# Patient Record
Sex: MALE | Race: BLACK OR AFRICAN AMERICAN | NOT HISPANIC OR LATINO | Employment: FULL TIME | URBAN - METROPOLITAN AREA
[De-identification: names, ages, dates, MRNs, and addresses within clinical notes are randomized per-mention and may not be internally consistent; named-entity substitution may affect disease eponyms.]

---

## 2020-07-24 ENCOUNTER — LAB VISIT (OUTPATIENT)
Dept: PRIMARY CARE CLINIC | Facility: OTHER | Age: 55
End: 2020-07-24
Attending: INTERNAL MEDICINE
Payer: COMMERCIAL

## 2020-07-24 DIAGNOSIS — Z03.818 ENCOUNTER FOR OBSERVATION FOR SUSPECTED EXPOSURE TO OTHER BIOLOGICAL AGENTS RULED OUT: ICD-10-CM

## 2020-07-24 PROCEDURE — U0003 INFECTIOUS AGENT DETECTION BY NUCLEIC ACID (DNA OR RNA); SEVERE ACUTE RESPIRATORY SYNDROME CORONAVIRUS 2 (SARS-COV-2) (CORONAVIRUS DISEASE [COVID-19]), AMPLIFIED PROBE TECHNIQUE, MAKING USE OF HIGH THROUGHPUT TECHNOLOGIES AS DESCRIBED BY CMS-2020-01-R: HCPCS

## 2020-08-03 LAB — SARS-COV-2 RNA RESP QL NAA+PROBE: NEGATIVE

## 2021-02-08 ENCOUNTER — OFFICE VISIT (OUTPATIENT)
Dept: URGENT CARE | Facility: CLINIC | Age: 56
End: 2021-02-08
Payer: COMMERCIAL

## 2021-02-08 VITALS
RESPIRATION RATE: 18 BRPM | TEMPERATURE: 98 F | HEART RATE: 69 BPM | OXYGEN SATURATION: 97 % | SYSTOLIC BLOOD PRESSURE: 116 MMHG | DIASTOLIC BLOOD PRESSURE: 77 MMHG

## 2021-02-08 DIAGNOSIS — Z20.822 COVID-19 VIRUS NOT DETECTED: ICD-10-CM

## 2021-02-08 DIAGNOSIS — Z20.822 ENCOUNTER FOR LABORATORY TESTING FOR COVID-19 VIRUS: Primary | ICD-10-CM

## 2021-02-08 LAB
CTP QC/QA: YES
SARS-COV-2 RDRP RESP QL NAA+PROBE: NEGATIVE

## 2021-02-08 PROCEDURE — U0002: ICD-10-PCS | Mod: QW,S$GLB,, | Performed by: NURSE PRACTITIONER

## 2021-02-08 PROCEDURE — 99203 OFFICE O/P NEW LOW 30 MIN: CPT | Mod: S$GLB,CS,, | Performed by: NURSE PRACTITIONER

## 2021-02-08 PROCEDURE — 99203 PR OFFICE/OUTPT VISIT, NEW, LEVL III, 30-44 MIN: ICD-10-PCS | Mod: S$GLB,CS,, | Performed by: NURSE PRACTITIONER

## 2021-02-08 PROCEDURE — U0002 COVID-19 LAB TEST NON-CDC: HCPCS | Mod: QW,S$GLB,, | Performed by: NURSE PRACTITIONER

## 2021-07-07 ENCOUNTER — OFFICE VISIT (OUTPATIENT)
Dept: URGENT CARE | Facility: CLINIC | Age: 56
End: 2021-07-07
Payer: COMMERCIAL

## 2021-07-07 VITALS
OXYGEN SATURATION: 97 % | HEART RATE: 80 BPM | TEMPERATURE: 99 F | SYSTOLIC BLOOD PRESSURE: 140 MMHG | RESPIRATION RATE: 16 BRPM | DIASTOLIC BLOOD PRESSURE: 81 MMHG

## 2021-07-07 DIAGNOSIS — R50.9 FEVER, UNSPECIFIED FEVER CAUSE: Primary | ICD-10-CM

## 2021-07-07 LAB
CTP QC/QA: YES
SARS-COV-2 RDRP RESP QL NAA+PROBE: NEGATIVE

## 2021-07-07 PROCEDURE — U0002 COVID-19 LAB TEST NON-CDC: HCPCS | Mod: QW,S$GLB,, | Performed by: NURSE PRACTITIONER

## 2021-07-07 PROCEDURE — 99499 NO LOS: ICD-10-PCS | Mod: S$GLB,,, | Performed by: NURSE PRACTITIONER

## 2021-07-07 PROCEDURE — 99499 UNLISTED E&M SERVICE: CPT | Mod: S$GLB,,, | Performed by: NURSE PRACTITIONER

## 2021-07-07 PROCEDURE — U0002: ICD-10-PCS | Mod: QW,S$GLB,, | Performed by: NURSE PRACTITIONER

## 2023-07-04 ENCOUNTER — HOSPITAL ENCOUNTER (EMERGENCY)
Facility: HOSPITAL | Age: 58
Discharge: HOME | End: 2023-07-04
Attending: EMERGENCY MEDICINE
Payer: COMMERCIAL

## 2023-07-04 VITALS
OXYGEN SATURATION: 98 % | SYSTOLIC BLOOD PRESSURE: 137 MMHG | DIASTOLIC BLOOD PRESSURE: 84 MMHG | BODY MASS INDEX: 24.05 KG/M2 | TEMPERATURE: 98 F | HEART RATE: 72 BPM | RESPIRATION RATE: 19 BRPM | WEIGHT: 168 LBS | HEIGHT: 70 IN

## 2023-07-04 DIAGNOSIS — K40.30 IRREDUCIBLE LEFT INGUINAL HERNIA: Primary | ICD-10-CM

## 2023-07-04 PROCEDURE — 99282 EMERGENCY DEPT VISIT SF MDM: CPT

## 2023-07-04 NOTE — Clinical Note
"Camilo Ceballos" Javier was seen and treated in our emergency department on 7/4/2023.  He may return to work on 07/06/2023.       If you have any questions or concerns, please don't hesitate to call.      KAYE Topete RN    "

## 2023-07-04 NOTE — Clinical Note
"Camilo Ceballos" Javier was seen and treated in our emergency department on 7/4/2023.  He may return to work on 07/11/2023.  When you return to work you should not lift anything heavier than 10 lb until cleared by the surgeon     If you have any questions or concerns, please don't hesitate to call.      Camilo Okeefe MD"

## 2023-07-05 ENCOUNTER — HOSPITAL ENCOUNTER (OUTPATIENT)
Dept: RADIOLOGY | Facility: HOSPITAL | Age: 58
Discharge: HOME | End: 2023-07-05
Attending: SURGERY
Payer: COMMERCIAL

## 2023-07-05 ENCOUNTER — OFFICE VISIT (OUTPATIENT)
Dept: SURGERY | Facility: CLINIC | Age: 58
End: 2023-07-05
Payer: COMMERCIAL

## 2023-07-05 ENCOUNTER — HOSPITAL ENCOUNTER (OUTPATIENT)
Dept: PREADMISSION TESTING | Facility: HOSPITAL | Age: 58
Discharge: HOME | End: 2023-07-05
Attending: SURGERY
Payer: COMMERCIAL

## 2023-07-05 VITALS
HEIGHT: 70 IN | WEIGHT: 168 LBS | BODY MASS INDEX: 24.05 KG/M2 | HEART RATE: 72 BPM | SYSTOLIC BLOOD PRESSURE: 137 MMHG | DIASTOLIC BLOOD PRESSURE: 84 MMHG | RESPIRATION RATE: 17 BRPM

## 2023-07-05 DIAGNOSIS — K40.30 IRREDUCIBLE LEFT INGUINAL HERNIA: Primary | ICD-10-CM

## 2023-07-05 DIAGNOSIS — K40.30 IRREDUCIBLE LEFT INGUINAL HERNIA: ICD-10-CM

## 2023-07-05 PROCEDURE — 71046 X-RAY EXAM CHEST 2 VIEWS: CPT | Mod: TC

## 2023-07-05 PROCEDURE — 3075F PR MOST RECENT SYSTOLIC BLOOD PRESS GE 130-139MM HG: ICD-10-PCS | Mod: CPTII,S$GLB,, | Performed by: SURGERY

## 2023-07-05 PROCEDURE — 99204 PR OFFICE/OUTPT VISIT, NEW, LEVL IV, 45-59 MIN: ICD-10-PCS | Mod: S$GLB,,, | Performed by: SURGERY

## 2023-07-05 PROCEDURE — 71046 XR CHEST PA AND LATERAL: ICD-10-PCS | Mod: 26,,, | Performed by: RADIOLOGY

## 2023-07-05 PROCEDURE — 71046 X-RAY EXAM CHEST 2 VIEWS: CPT | Mod: 26,,, | Performed by: RADIOLOGY

## 2023-07-05 PROCEDURE — 3079F DIAST BP 80-89 MM HG: CPT | Mod: CPTII,S$GLB,, | Performed by: SURGERY

## 2023-07-05 PROCEDURE — 93010 EKG 12-LEAD: ICD-10-PCS | Mod: ,,, | Performed by: INTERNAL MEDICINE

## 2023-07-05 PROCEDURE — 3008F BODY MASS INDEX DOCD: CPT | Mod: CPTII,S$GLB,, | Performed by: SURGERY

## 2023-07-05 PROCEDURE — 3079F PR MOST RECENT DIASTOLIC BLOOD PRESSURE 80-89 MM HG: ICD-10-PCS | Mod: CPTII,S$GLB,, | Performed by: SURGERY

## 2023-07-05 PROCEDURE — 93005 ELECTROCARDIOGRAM TRACING: CPT

## 2023-07-05 PROCEDURE — 93010 ELECTROCARDIOGRAM REPORT: CPT | Mod: ,,, | Performed by: INTERNAL MEDICINE

## 2023-07-05 PROCEDURE — 1159F MED LIST DOCD IN RCRD: CPT | Mod: CPTII,S$GLB,, | Performed by: SURGERY

## 2023-07-05 PROCEDURE — 99204 OFFICE O/P NEW MOD 45 MIN: CPT | Mod: S$GLB,,, | Performed by: SURGERY

## 2023-07-05 PROCEDURE — 3075F SYST BP GE 130 - 139MM HG: CPT | Mod: CPTII,S$GLB,, | Performed by: SURGERY

## 2023-07-05 PROCEDURE — 3008F PR BODY MASS INDEX (BMI) DOCUMENTED: ICD-10-PCS | Mod: CPTII,S$GLB,, | Performed by: SURGERY

## 2023-07-05 PROCEDURE — 1159F PR MEDICATION LIST DOCUMENTED IN MEDICAL RECORD: ICD-10-PCS | Mod: CPTII,S$GLB,, | Performed by: SURGERY

## 2023-07-05 NOTE — H&P
GENERAL SURGERY  OUTPATIENT H&P    REASON FOR VISIT/CC: Left inguinal hernia    HPI: Camilo Herrera is a 58 y.o. male referred from the emergency room for evaluation of a non reducible left inguinal hernia. Proximally 1 month history of a bulge in the left groin.  Reports this has been mostly reducible and asymptomatic until recently while lifting heavy objects at work the bulge become prominent extending down into the scrotum. He was unable to reduce appeared presented the emergency room where he was found to have a non reducible left inguinal hernia. Patient was staple and no conservative bowel compromise and was referred to General surgery. Patient reports no previous surgeries. No significant past medical history. No allergies and no medications. He reports some tenderness in the left groin and reports he can not completely reduce the bulge anymore. He does do a lot of heavy lifting at his job working with [employer]. Currently he is on a 7 day break recommended by the emergency room physician.    I have reviewed the patient's chart including prior progress notes, procedures and testing.     ROS:   Review of Systems    PROBLEM LIST:  There is no problem list on file for this patient.        HISTORY  No past medical history on file.    No past surgical history on file.         No family history on file.      MEDS:  No current outpatient medications on file prior to visit.     No current facility-administered medications on file prior to visit.       ALLERGIES:  Review of patient's allergies indicates:  No Known Allergies      VITALS:  Vitals:    07/05/23 1120   BP: 137/84   Pulse: 72   Resp: 17         PHYSICAL EXAM:  Physical Exam  Vitals reviewed.   Constitutional:       General: He is not in acute distress.     Appearance: Normal appearance. He is well-developed.   HENT:      Head: Normocephalic and atraumatic.   Eyes:      General: No scleral icterus.  Neck:      Trachea: No tracheal deviation.    Cardiovascular:      Rate and Rhythm: Normal rate and regular rhythm.      Pulses: Normal pulses.   Pulmonary:      Effort: Pulmonary effort is normal. No respiratory distress.      Breath sounds: Normal breath sounds.   Abdominal:      General: There is no distension.      Palpations: Abdomen is soft.      Tenderness: There is no abdominal tenderness.      Hernia: A hernia (None reducible left inguinal hernia with scrotal component, mild tenderness palpation but no significant pain.  No significant right-sided component appreciated.) is present.   Musculoskeletal:         General: No swelling or tenderness. Normal range of motion.      Cervical back: Normal range of motion and neck supple. No rigidity.   Skin:     General: Skin is warm and dry.      Coloration: Skin is not jaundiced.      Findings: No erythema.   Neurological:      General: No focal deficit present.      Mental Status: He is alert and oriented to person, place, and time. He is not disoriented.      Motor: No weakness or abnormal muscle tone.   Psychiatric:         Mood and Affect: Mood normal.         Behavior: Behavior normal.         Thought Content: Thought content normal.         Judgment: Judgment normal.         LABS:  No results found for: WBC, RBC, HGB, HCT, PLT  No results found for: GLU, NA, K, CL, CO2, BUN, CREATININE, CALCIUM  No results found for: ALT, AST, GGT, ALKPHOS, BILITOT  No results found for: MG, PHOS    STUDIES:  Images and reports were personally reviewed.  N/a      ASSESSMENT & PLAN:  58 y.o. male with a non reducible left inguinal hernia  -diagnosis discussed with the patient  -suspect bowel involvement given the large size and extension into the scrotum however no evidence of bowel compromise at this time  -I do recommend repair on a more urgent basis given the non reducible nature of this and suspected bowel involvement  -I discussed with the patient my recommendation for robotic repair with mesh as well as the  associated risk of pain, bleeding, scarring, infection chronic groin pain, injury to bowel, hernia recurrence, mesh infection, seroma, hematoma etc., patient expressed understanding these risks and agreed proceed with surgical intervention  -will plan for robotic left versus bilateral inguinal hernia repair with mesh on Monday 07/10/2023  -preop labs, chest x-ray and EKG ordered

## 2023-07-05 NOTE — ED PROVIDER NOTES
"Encounter Date: 7/4/2023       History     Chief Complaint   Patient presents with    Groin Pain     Pt complains of swelling to L groin.  Claims that "it used to go down when I pressed on it, but now it doesn't".  Has been unloading trucks at work recently.     Patient is a 58-year-old male who presents emergency room for left groin swelling that extends into his scrotum.  He states this been going on for 2 weeks.  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  He denies any vomiting abdominal pain redness of the scrotum or difficulty urinating.  No history of hernia.  No back pain.  No other complaints at this time    Review of patient's allergies indicates:  No Known Allergies  No past medical history on file.  No past surgical history on file.  No family history on file.     Review of Systems   Constitutional:  Negative for fever.   Respiratory:  Negative for cough and shortness of breath.    Cardiovascular:  Negative for chest pain.   Gastrointestinal:  Negative for abdominal pain, nausea and vomiting.   Genitourinary:  Positive for scrotal swelling. Negative for dysuria and flank pain.        Left groin swelling   Musculoskeletal:  Negative for back pain and myalgias.   Skin:  Negative for rash and wound.   Neurological:  Negative for weakness and numbness.     Physical Exam     Initial Vitals [07/04/23 2239]   BP Pulse Resp Temp SpO2   137/84 72 19 98.3 °F (36.8 °C) 98 %      MAP       --         Physical Exam    Nursing note and vitals reviewed.  Constitutional: He appears well-developed and well-nourished. No distress.   HENT:   Mouth/Throat: Oropharynx is clear and moist.   Cardiovascular:  Normal rate, regular rhythm and normal heart sounds.           Pulmonary/Chest: Breath sounds normal. He has no wheezes. He has no rhonchi. He has no rales.   Abdominal: Abdomen is soft. He exhibits no distension. There is no abdominal tenderness.   Genitourinary:    Genitourinary Comments: Large left inguinal hernia that " extends into the scrotum.  No significant warmth tenderness or induration.  It is large not easily reducible     Musculoskeletal:         General: No edema.     Neurological: He is alert and oriented to person, place, and time. He has normal strength. No sensory deficit.   Skin: Skin is warm and dry.       ED Course   Procedures  Labs Reviewed - No data to display       Imaging Results    None          Medications - No data to display  Medical Decision Making:   Patient has a large non reducible left inguinal hernia but no signs of strangulation or bowel obstruction at this time.  It is not even significantly tender.  It has been going on for 2 weeks.  I feel that he is stable for outpatient follow up with General surgery.  Return precautions given.  Stool softeners.  Stable for discharge                        Clinical Impression:   Final diagnoses:  [K40.30] Irreducible left inguinal hernia (Primary)        ED Disposition Condition    Discharge Stable          ED Prescriptions    None       Follow-up Information       Follow up With Specialties Details Why Contact Info        Return to the emergency department for increasing pain redness swelling abdominal pain vomiting    Yandel John MD General Surgery, Colon and Rectal Surgery, Surgery Schedule an appointment as soon as possible for a visit  Call tomorrow morning to schedule follow-up appointment with the general surgeon. [address]  [address]  [place] [address]  [phone]               Camilo Okeefe MD  07/04/23 7976

## 2023-07-05 NOTE — DISCHARGE INSTRUCTIONS
To confirm, Your doctor has instructed you that surgery is scheduled for: 7/10/23    Please report to [facility], Registration the morning of surgery. You must check-in and receive a wristband before going to your procedure.    Pre-Op will call the afternoon prior to surgery between 1:00 and 6:00 PM with the final arrival time.  Phone number: [phone]    PLEASE NOTE:  The surgery schedule has many variables which may affect the time of your surgery case.  Family members should be available if your surgery time changes.  Plan to be here the day of your procedure between 4-6 hours.    MEDICATIONS:  TAKE ONLY THESE MEDICATIONS WITH A SMALL SIP OF WATER THE MORNING OF YOUR PROCEDURE:    NO MEDS        DO NOT TAKE THESE MEDICATIONS 5-7 DAYS PRIOR to your procedure or per your surgeon's request:   ASPIRIN, ALEVE, ADVIL, IBUPROFEN, FISH OIL VITAMIN E, HERBALS  (May take Tylenol)    ONLY if you are prescribed any types of blood thinners such as:  Aspirin, Coumadin, Plavix, Pradaxa, Xarelto, Aggrenox, Effient, Eliquis, Savasya, Brilinta, or any other, ask your surgeon whether you should stop taking them and how long before surgery you should stop.  You may also need to verify with the prescribing physician if it is ok to stop your medication.      INSTRUCTIONS IMPORTANT!!  Do not eat or drink anything between midnight and the time of your procedure- this includes gum, mints, and candy.  Do not smoke or drink alcoholic beverages 24 hours prior to your procedure.  Shower the night before AND the morning of your procedure with a Chlorhexidine wash such as Hibiclens or Dial antibacterial soap from the neck down.  Do not get it on your face or in your eyes.  You may use your own shampoo and face wash. This helps your skin to be as bacteria free as possible.    If you wear contact lenses, dentures, hearing aids or glasses, bring a container to put them in during surgery and give to a family member for  safe keeping.  Please leave all jewelry, piercing's and valuables at home. You must remove your false eyelashes prior to surgery.    DO NOT remove hair from the surgery site.  Do not shave the incision site unless you are given specific instructions to do so.    ONLY if you have been diagnosed with sleep apnea please bring your C-PAP machine.  ONLY if you wear home oxygen please bring your portable oxygen tank the day of your procedure.  ONLY if you have a history of OPEN HEART SURGERY you will need a clearance from your Cardiologist per Anesthesia.      ONLY for patients requiring bowel prep, written instructions will be given by your doctor's office.  ONLY if you have a neuro stimulator, please bring the controller with you the morning of surgery  ONLY if a type and screen test is needed before surgery, please return:  If your doctor has scheduled you for an overnight stay, bring a small overnight bag with any personal items you need.  Make arrangements in advance for transportation home by a responsible adult.  It is not safe to drive a vehicle during the 24 hours after anesthesia.      [facility] Visitor Policy    Effective September 26, 2022    [facility] will resume routine visitation for COVID-19 negative patients, including inpatients, outpatients, and procedural areas, in accordance with local campus procedures.    All [facility] facilities and properties are tobacco free.  Smoking is NOT allowed.   If you have any questions about these instructions, call Pre-Op Admit  Nursing at [phone] or the Pre-Op Day Surgery Unit at [phone].

## 2023-07-05 NOTE — DISCHARGE INSTRUCTIONS
Take over-the-counter stool softeners to make it easier to have a bowel movement so you are not straining which can make the hernia larger.  Do not lift anything over 10 lb.

## 2023-07-09 ENCOUNTER — ANESTHESIA EVENT (OUTPATIENT)
Dept: SURGERY | Facility: HOSPITAL | Age: 58
End: 2023-07-09
Payer: COMMERCIAL

## 2023-07-10 ENCOUNTER — ANESTHESIA (OUTPATIENT)
Dept: SURGERY | Facility: HOSPITAL | Age: 58
End: 2023-07-10
Payer: COMMERCIAL

## 2023-07-10 ENCOUNTER — HOSPITAL ENCOUNTER (OUTPATIENT)
Facility: HOSPITAL | Age: 58
Discharge: HOME | End: 2023-07-10
Attending: SURGERY | Admitting: SURGERY
Payer: COMMERCIAL

## 2023-07-10 VITALS
SYSTOLIC BLOOD PRESSURE: 119 MMHG | BODY MASS INDEX: 23.91 KG/M2 | HEART RATE: 87 BPM | TEMPERATURE: 98 F | HEIGHT: 70 IN | OXYGEN SATURATION: 96 % | DIASTOLIC BLOOD PRESSURE: 74 MMHG | RESPIRATION RATE: 16 BRPM | WEIGHT: 167 LBS

## 2023-07-10 DIAGNOSIS — K40.30 IRREDUCIBLE LEFT INGUINAL HERNIA: Primary | ICD-10-CM

## 2023-07-10 PROCEDURE — C1781 MESH (IMPLANTABLE): HCPCS | Performed by: SURGERY

## 2023-07-10 PROCEDURE — 71000015 HC POSTOP RECOV 1ST HR: Performed by: SURGERY

## 2023-07-10 PROCEDURE — 94799 UNLISTED PULMONARY SVC/PX: CPT

## 2023-07-10 PROCEDURE — D9220A PRA ANESTHESIA: ICD-10-PCS | Mod: CRNA,,, | Performed by: NURSE ANESTHETIST, CERTIFIED REGISTERED

## 2023-07-10 PROCEDURE — 25000003 PHARM REV CODE 250: Performed by: SURGERY

## 2023-07-10 PROCEDURE — 25000003 PHARM REV CODE 250: Performed by: ANESTHESIOLOGY

## 2023-07-10 PROCEDURE — 49650 PR LAP,INGUINAL HERNIA REPR,INITIAL: ICD-10-PCS | Mod: 22,50,S$GLB, | Performed by: SURGERY

## 2023-07-10 PROCEDURE — 27201423 OPTIME MED/SURG SUP & DEVICES STERILE SUPPLY: Performed by: SURGERY

## 2023-07-10 PROCEDURE — 63600175 PHARM REV CODE 636 W HCPCS: Performed by: SURGERY

## 2023-07-10 PROCEDURE — D9220A PRA ANESTHESIA: ICD-10-PCS | Mod: ANES,,, | Performed by: ANESTHESIOLOGY

## 2023-07-10 PROCEDURE — 63600175 PHARM REV CODE 636 W HCPCS: Performed by: NURSE ANESTHETIST, CERTIFIED REGISTERED

## 2023-07-10 PROCEDURE — 36000711: Performed by: SURGERY

## 2023-07-10 PROCEDURE — D9220A PRA ANESTHESIA: Mod: CRNA,,, | Performed by: NURSE ANESTHETIST, CERTIFIED REGISTERED

## 2023-07-10 PROCEDURE — 49650 LAP ING HERNIA REPAIR INIT: CPT | Mod: 22,50,S$GLB, | Performed by: SURGERY

## 2023-07-10 PROCEDURE — 36000710: Performed by: SURGERY

## 2023-07-10 PROCEDURE — 37000009 HC ANESTHESIA EA ADD 15 MINS: Performed by: SURGERY

## 2023-07-10 PROCEDURE — 37000008 HC ANESTHESIA 1ST 15 MINUTES: Performed by: SURGERY

## 2023-07-10 PROCEDURE — 71000033 HC RECOVERY, INTIAL HOUR: Performed by: SURGERY

## 2023-07-10 PROCEDURE — D9220A PRA ANESTHESIA: Mod: ANES,,, | Performed by: ANESTHESIOLOGY

## 2023-07-10 PROCEDURE — 94760 N-INVAS EAR/PLS OXIMETRY 1: CPT

## 2023-07-10 PROCEDURE — 25000003 PHARM REV CODE 250: Performed by: NURSE ANESTHETIST, CERTIFIED REGISTERED

## 2023-07-10 PROCEDURE — 71000039 HC RECOVERY, EACH ADD'L HOUR: Performed by: SURGERY

## 2023-07-10 PROCEDURE — 63600175 PHARM REV CODE 636 W HCPCS: Performed by: ANESTHESIOLOGY

## 2023-07-10 PROCEDURE — 71000016 HC POSTOP RECOV ADDL HR: Performed by: SURGERY

## 2023-07-10 DEVICE — MESH PROGRIP LAP 10X15CM RIGHT: Type: IMPLANTABLE DEVICE | Site: GROIN | Status: FUNCTIONAL

## 2023-07-10 DEVICE — MESH PROGRIP LAP 10X15CM LEFT: Type: IMPLANTABLE DEVICE | Site: GROIN | Status: FUNCTIONAL

## 2023-07-10 RX ORDER — ROCURONIUM BROMIDE 10 MG/ML
INJECTION, SOLUTION INTRAVENOUS
Status: DISCONTINUED | OUTPATIENT
Start: 2023-07-10 | End: 2023-07-10

## 2023-07-10 RX ORDER — NEOSTIGMINE METHYLSULFATE 1 MG/ML
INJECTION, SOLUTION INTRAVENOUS
Status: DISCONTINUED | OUTPATIENT
Start: 2023-07-10 | End: 2023-07-10

## 2023-07-10 RX ORDER — ONDANSETRON 2 MG/ML
INJECTION INTRAMUSCULAR; INTRAVENOUS
Status: DISCONTINUED | OUTPATIENT
Start: 2023-07-10 | End: 2023-07-10

## 2023-07-10 RX ORDER — LIDOCAINE HYDROCHLORIDE 20 MG/ML
INJECTION INTRAVENOUS
Status: DISCONTINUED | OUTPATIENT
Start: 2023-07-10 | End: 2023-07-10

## 2023-07-10 RX ORDER — HYDROMORPHONE HYDROCHLORIDE 2 MG/ML
0.2 INJECTION, SOLUTION INTRAMUSCULAR; INTRAVENOUS; SUBCUTANEOUS EVERY 5 MIN PRN
Status: DISCONTINUED | OUTPATIENT
Start: 2023-07-10 | End: 2023-07-10 | Stop reason: HOSPADM

## 2023-07-10 RX ORDER — DEXAMETHASONE SODIUM PHOSPHATE 4 MG/ML
INJECTION, SOLUTION INTRA-ARTICULAR; INTRALESIONAL; INTRAMUSCULAR; INTRAVENOUS; SOFT TISSUE
Status: DISCONTINUED | OUTPATIENT
Start: 2023-07-10 | End: 2023-07-10

## 2023-07-10 RX ORDER — LIDOCAINE HYDROCHLORIDE 10 MG/ML
1 INJECTION, SOLUTION EPIDURAL; INFILTRATION; INTRACAUDAL; PERINEURAL ONCE
Status: DISCONTINUED | OUTPATIENT
Start: 2023-07-10 | End: 2023-07-10 | Stop reason: HOSPADM

## 2023-07-10 RX ORDER — MIDAZOLAM HYDROCHLORIDE 1 MG/ML
INJECTION INTRAMUSCULAR; INTRAVENOUS
Status: DISCONTINUED | OUTPATIENT
Start: 2023-07-10 | End: 2023-07-10

## 2023-07-10 RX ORDER — PROPOFOL 10 MG/ML
VIAL (ML) INTRAVENOUS
Status: DISCONTINUED | OUTPATIENT
Start: 2023-07-10 | End: 2023-07-10

## 2023-07-10 RX ORDER — HYDROCODONE BITARTRATE AND ACETAMINOPHEN 5; 325 MG/1; MG/1
1 TABLET ORAL EVERY 6 HOURS PRN
Qty: 28 TABLET | Refills: 0 | Status: SHIPPED | OUTPATIENT
Start: 2023-07-10 | End: 2023-09-01 | Stop reason: SDUPTHER

## 2023-07-10 RX ORDER — KETOROLAC TROMETHAMINE 30 MG/ML
INJECTION, SOLUTION INTRAMUSCULAR; INTRAVENOUS
Status: DISCONTINUED | OUTPATIENT
Start: 2023-07-10 | End: 2023-07-10

## 2023-07-10 RX ORDER — EPHEDRINE SULFATE 50 MG/ML
INJECTION, SOLUTION INTRAVENOUS
Status: DISCONTINUED | OUTPATIENT
Start: 2023-07-10 | End: 2023-07-10

## 2023-07-10 RX ORDER — BUPIVACAINE HYDROCHLORIDE AND EPINEPHRINE 5; 5 MG/ML; UG/ML
INJECTION, SOLUTION EPIDURAL; INTRACAUDAL; PERINEURAL
Status: DISCONTINUED | OUTPATIENT
Start: 2023-07-10 | End: 2023-07-10 | Stop reason: HOSPADM

## 2023-07-10 RX ORDER — OXYCODONE HYDROCHLORIDE 5 MG/1
5 TABLET ORAL
Status: DISCONTINUED | OUTPATIENT
Start: 2023-07-10 | End: 2023-07-10 | Stop reason: HOSPADM

## 2023-07-10 RX ORDER — ACETAMINOPHEN 10 MG/ML
INJECTION, SOLUTION INTRAVENOUS
Status: DISCONTINUED | OUTPATIENT
Start: 2023-07-10 | End: 2023-07-10

## 2023-07-10 RX ORDER — CEFAZOLIN SODIUM 2 G/50ML
2 SOLUTION INTRAVENOUS
Status: COMPLETED | OUTPATIENT
Start: 2023-07-10 | End: 2023-07-10

## 2023-07-10 RX ORDER — SUCCINYLCHOLINE CHLORIDE 20 MG/ML
INJECTION INTRAMUSCULAR; INTRAVENOUS
Status: DISCONTINUED | OUTPATIENT
Start: 2023-07-10 | End: 2023-07-10

## 2023-07-10 RX ORDER — FENTANYL CITRATE 50 UG/ML
INJECTION, SOLUTION INTRAMUSCULAR; INTRAVENOUS
Status: DISCONTINUED | OUTPATIENT
Start: 2023-07-10 | End: 2023-07-10

## 2023-07-10 RX ORDER — FENTANYL CITRATE 50 UG/ML
25 INJECTION, SOLUTION INTRAMUSCULAR; INTRAVENOUS EVERY 5 MIN PRN
Status: DISCONTINUED | OUTPATIENT
Start: 2023-07-10 | End: 2023-07-10 | Stop reason: HOSPADM

## 2023-07-10 RX ADMIN — NEOSTIGMINE METHYLSULFATE 5 MG: 1 INJECTION INTRAVENOUS at 10:07

## 2023-07-10 RX ADMIN — EPHEDRINE SULFATE 10 MG: 50 INJECTION, SOLUTION INTRAMUSCULAR; INTRAVENOUS; SUBCUTANEOUS at 08:07

## 2023-07-10 RX ADMIN — EPHEDRINE SULFATE 10 MG: 50 INJECTION, SOLUTION INTRAMUSCULAR; INTRAVENOUS; SUBCUTANEOUS at 07:07

## 2023-07-10 RX ADMIN — GLYCOPYRROLATE 0.6 MG: 0.2 INJECTION, SOLUTION INTRAMUSCULAR; INTRAVITREAL at 10:07

## 2023-07-10 RX ADMIN — ROCURONIUM BROMIDE 25 MG: 10 INJECTION, SOLUTION INTRAVENOUS at 07:07

## 2023-07-10 RX ADMIN — SODIUM CHLORIDE, SODIUM GLUCONATE, SODIUM ACETATE, POTASSIUM CHLORIDE AND MAGNESIUM CHLORIDE: 526; 502; 368; 37; 30 INJECTION, SOLUTION INTRAVENOUS at 06:07

## 2023-07-10 RX ADMIN — MIDAZOLAM HYDROCHLORIDE 2 MG: 1 INJECTION, SOLUTION INTRAMUSCULAR; INTRAVENOUS at 07:07

## 2023-07-10 RX ADMIN — LIDOCAINE HYDROCHLORIDE 100 MG: 20 INJECTION, SOLUTION INTRAVENOUS at 07:07

## 2023-07-10 RX ADMIN — SODIUM CHLORIDE, SODIUM GLUCONATE, SODIUM ACETATE, POTASSIUM CHLORIDE AND MAGNESIUM CHLORIDE: 526; 502; 368; 37; 30 INJECTION, SOLUTION INTRAVENOUS at 08:07

## 2023-07-10 RX ADMIN — HYDROMORPHONE HYDROCHLORIDE 0.2 MG: 2 INJECTION INTRAMUSCULAR; INTRAVENOUS; SUBCUTANEOUS at 11:07

## 2023-07-10 RX ADMIN — ACETAMINOPHEN 1000 MG: 10 INJECTION, SOLUTION INTRAVENOUS at 07:07

## 2023-07-10 RX ADMIN — FENTANYL CITRATE 50 MCG: 50 INJECTION, SOLUTION INTRAMUSCULAR; INTRAVENOUS at 10:07

## 2023-07-10 RX ADMIN — FENTANYL CITRATE 50 MCG: 50 INJECTION, SOLUTION INTRAMUSCULAR; INTRAVENOUS at 07:07

## 2023-07-10 RX ADMIN — FENTANYL CITRATE 25 MCG: 50 INJECTION, SOLUTION INTRAMUSCULAR; INTRAVENOUS at 11:07

## 2023-07-10 RX ADMIN — OXYCODONE HYDROCHLORIDE 5 MG: 5 TABLET ORAL at 11:07

## 2023-07-10 RX ADMIN — EPHEDRINE SULFATE 10 MG: 50 INJECTION, SOLUTION INTRAMUSCULAR; INTRAVENOUS; SUBCUTANEOUS at 09:07

## 2023-07-10 RX ADMIN — CEFAZOLIN SODIUM 2 G: 2 SOLUTION INTRAVENOUS at 07:07

## 2023-07-10 RX ADMIN — EPHEDRINE SULFATE 10 MG: 50 INJECTION, SOLUTION INTRAMUSCULAR; INTRAVENOUS; SUBCUTANEOUS at 10:07

## 2023-07-10 RX ADMIN — FENTANYL CITRATE 50 MCG: 50 INJECTION, SOLUTION INTRAMUSCULAR; INTRAVENOUS at 09:07

## 2023-07-10 RX ADMIN — DEXAMETHASONE SODIUM PHOSPHATE 4 MG: 4 INJECTION, SOLUTION INTRAMUSCULAR; INTRAVENOUS at 07:07

## 2023-07-10 RX ADMIN — ROCURONIUM BROMIDE 5 MG: 10 INJECTION, SOLUTION INTRAVENOUS at 07:07

## 2023-07-10 RX ADMIN — ONDANSETRON 4 MG: 2 INJECTION INTRAMUSCULAR; INTRAVENOUS at 07:07

## 2023-07-10 RX ADMIN — SUCCINYLCHOLINE CHLORIDE 120 MG: 20 INJECTION, SOLUTION INTRAMUSCULAR; INTRAVENOUS at 07:07

## 2023-07-10 RX ADMIN — KETOROLAC TROMETHAMINE 30 MG: 30 INJECTION, SOLUTION INTRAMUSCULAR; INTRAVENOUS at 10:07

## 2023-07-10 RX ADMIN — PROPOFOL 150 MG: 10 INJECTION, EMULSION INTRAVENOUS at 07:07

## 2023-07-10 NOTE — DISCHARGE SUMMARY
[facility]  Discharge Note  Short Stay    Procedure(s) (LRB):  ROBOTIC REPAIR, HERNIA, INGUINAL (Bilateral)      OUTCOME: Patient tolerated treatment/procedure well without complication and is now ready for discharge.    DISPOSITION: Home or Self Care    FINAL DIAGNOSIS:  <principal problem not specified>    FOLLOWUP: In clinic    DISCHARGE INSTRUCTIONS:    Discharge Procedure Orders   Diet Adult Regular     Ice to affected area     Lifting restrictions   Order Comments: Please avoid lifting greater than 20 lb, straining, strenuous activity for six weeks.     Change dressing (specify)   Order Comments: Post-Operative Wound Care    A surgical glue has been placed over your incisions, please leave the glue in place and do not attempt to remove it.  It is ok to shower using mild soap and water over the incisions the day after your procedure. Pat dry your incisions. Do not soak in a bathtub or other body of water for 2 weeks or until cleared by your surgeon.     If you noticed redness, swelling, fever, increasing pain or significant drainage from your wound please call/message the office or the 24 hr nurse hotline after hours.     Notify your health care provider if you experience any of the following:  temperature >100.4     Notify your health care provider if you experience any of the following:  persistent nausea and vomiting or diarrhea     Notify your health care provider if you experience any of the following:  severe uncontrolled pain     Notify your health care provider if you experience any of the following:  redness, tenderness, or signs of infection (pain, swelling, redness, odor or green/yellow discharge around incision site)     Notify your health care provider if you experience any of the following:  worsening rash     Notify your health care provider if you experience any of the following:  increased confusion or weakness     Shower on day dressing removed (No bath)         TIME SPENT ON DISCHARGE: 10 minutes

## 2023-07-10 NOTE — PLAN OF CARE
Patient and spouse given discharge instructions. Post-op appointment July 24th . Prescription for [place] sent to pharmacy.  Educated on post-anesthesia precautions, dressing care and when to notify MD. Verbalized understanding. Peripheral IV removed with catheter intact. Incisions to abd clean, dry and intact. All belongings given back to patient. Patient transferred to car via wheelchair  and left with spouse to home.

## 2023-07-10 NOTE — ANESTHESIA PREPROCEDURE EVALUATION
07/10/2023  Camilo Herrera is a 58 y.o., male.      Pre-op Assessment    I have reviewed the Patient Summary Reports.     I have reviewed the Nursing Notes.       Review of Systems  Anesthesia Hx:  No problems with previous Anesthesia    Cardiovascular:  Cardiovascular Normal     Pulmonary:  Pulmonary Normal        Physical Exam  General: Well nourished        Anesthesia Plan  Type of Anesthesia, risks & benefits discussed:    Anesthesia Type: Gen ETT  Intra-op Monitoring Plan: Standard ASA Monitors  Post Op Pain Control Plan: multimodal analgesia and IV/PO Opioids PRN  Induction:  IV  Airway Plan: Video  Informed Consent: Informed consent signed with the Patient and all parties understand the risks and agree with anesthesia plan.  All questions answered.   ASA Score: 1  Day of Surgery Review of History & Physical: H&P Update referred to the surgeon/provider.    Ready For Surgery From Anesthesia Perspective.     .

## 2023-07-10 NOTE — PROGRESS NOTES
Deb Aviles RN called  to notify him of pt having scrotal swelling. MD stated that scrotal swelling and bruising is to be expected and that he also notified the pt's wife of this. MD stated to give pt a scrotal support/jock strap for home to help with swelling. Scrotal support/jock strap given to pt for home use.

## 2023-07-10 NOTE — PLAN OF CARE
Pt prepped for surgery. Consents at bedside. Incentive spirometry taught by respiratory. Pt belongings placed in postop cabinet. Lynette set up with text alerts.

## 2023-07-10 NOTE — ANESTHESIA PROCEDURE NOTES
Intubation    Date/Time: 7/10/2023 7:38 AM  Performed by: Akira Wasserman CRNA  Authorized by: Ary Nance MD     Intubation:     Induction:  Intravenous    Intubated:  Postinduction    Mask Ventilation:  Easy mask    Attempts:  1    Attempted By:  CRNA    Method of Intubation:  Video laryngoscopy    Blade:  Luna 3    Laryngeal View Grade: Grade I - full view of cords      Difficult Airway Encountered?: No      Complications:  None    Airway Device:  Oral endotracheal tube    Airway Device Size:  7.5    Style/Cuff Inflation:  Cuffed (inflated to minimal occlusive pressure)    Tube secured:  22    Secured at:  The lips    Placement Verified By:  Capnometry    Complicating Factors:  None    Findings Post-Intubation:  BS equal bilateral and atraumatic/condition of teeth unchanged

## 2023-07-10 NOTE — DISCHARGE INSTRUCTIONS
General Information:    1.  Do not drink alcoholic beverages including beer for 24 hours or as long as you are on pain medication..  2.  Do not drive a motor vehicle, operate machinery or power tools, or signs legal papers for 24 hours or as long as you are on pain medication.   3.  You may experience light-headedness, dizziness, and sleepiness following surgery. Please do not stay alone. A responsible adult should be with you for this 24 hour period.  4.  Go home and rest.    5. Progress slowly to a normal diet unless instructed.  Otherwise, begin with liquids such as soft drinks, then soup and crackers working up to solid foods. Drink plenty of nonalcoholic fluids.  6.  Certain anesthetics and pain medications produce nausea and vomiting in certain       individuals. If nausea becomes a problem at home, call you doctor.    7. A nurse will be calling you sometime after surgery. Do not be alarmed. This is our way of finding out how you are doing.    8. Several times every hour while you are awake, take 2-3 deep breaths and cough. If you had stomach surgery hold a pillow or rolled towel firmly against your stomach before you cough. This will help with any pain the cough might cause.  9. Several times every hour while you are awake, pump and flex your feet 5-6 times and do foot circles. This will help prevent blood clots.    10.Call your doctor for severe pain, bleeding, fever, or signs or symptoms of infection (pain, swelling, redness, foul odor, drainage).     Post op instructions for prevention of DVT  What is deep vein thrombosis?  Deep vein thrombosis (DVT) is the medical term for blood clots in the deep veins of the leg.  These blood clots can be dangerous.  A DVT can block a blood vessel and keep blood from getting where it needs to go.  Another problem is that the clot can travel to other parts of the body such as the lungs.  A clot that travels to the lungs is called a pulmonary embolus (PE) and can cause  serious problems with breathing which can lead to death.  Am I at risk for DVT/PE?  If you are not very active, you are at risk of DVT.  Anyone confined to bed, sitting for long periods of time, recovering from surgery, etc. increases the risk of DVT.  Other risk factors are cancer diagnosis, certain medications, estrogen replacement in any form,older age, obesity, pregnancy, smoking, history of clotting disorders, and dehydration.  How will I know if I have a DVT?  Swelling in the lower leg  Pain  Warmth, redness, hardness or bulging of the vein  If you have any of these symptoms, call your doctors office right away.  Some people will not have any symptoms until the clot moves to the lungs.  What are the symptoms of a PE?  Panting, shortness of breath, or trouble breathing  Sharp, knife-like chest pain when you breathe  Coughing or coughing up blood  Rapid heartbeat  If you have any of these symptoms or get worse quickly, call 911 for emergency treatment.  How can I prevent a DVT?  Avoid long periods of inactivity and dont cross your legs--get up and walk around every hour or so.  Stay active--walking after surgery is highly encouraged.  This means you should get out of the house and walk in the neighborhood.  Going up and down stairs will not impair healing (unless advised against such activity by your doctor).    Drink plenty of noncaffeinated, nonalcoholic fluids each day to prevent dehydration.  Wear special support stockings, if they have been advised by your doctor.  If you travel, stop at least once an hour and walk around.  Avoid smoking (assistance with stopping is available through your healthcare provider)  Always notify your doctor if you are not able to follow the post operative instructions that are given to you at the time of discharge.  It may be necessary to prescribe one of the medications available to prevent DVT.     Using an Incentive Spirometer    An incentive spirometer is a device that  helps you do deep breathing exercises. These exercises expand your lungs, aid in circulation, and help prevent pneumonia. Deep breathing exercises also help you breathe better and improve the function of your lungs by:  Keeping your lungs clear  Strengthening your breathing muscles  Helping prevent respiratory complications or problems  The incentive spirometer gives you a way to take an active part in recover. A nurse or therapist will teach you breathing exercises. To do these exercises, you will breathe in through your mouth and not your nose. The incentive spirometer only works correctly if you breathe in through your mouth.  Steps to clear lungs  Step 1. Exhale normally. Then, inhale normally.  Relax and breathe out.  Step 2. Place your lips tightly around the mouthpiece.  Make sure the device is upright and not tilted.  Step 3. Inhale as much air as you can through the mouthpiece (don't breath through your nose).  Inhale slowly and deeply.  Hold your breath long enough to keep the balls or disk raised for at least 3 to 5 seconds, or as instructed by your healthcare provider.  Some spirometers have an indicator to let you know that you are breathing in too fast. If the indicator goes off, breathe in more slowly.  Step 4. Repeat the exercise regularly.  Do this exercise every hour while you're awake, or as instructed by your healthcare provider.  If you were taught deep breathing and coughing exercises, do them regularly as instructed by your healthcare provider.      We hope your stay was comfortable as you heal now, mend and rest.    For we have enjoyed taking care of you by giving your our best.    And as you get better, by regaining your health and strength;   We count it as a privilege to have served you and hope your time at [facility] was well spent.      Thank  You!!!

## 2023-07-10 NOTE — OP NOTE
DATE OF PROCEDURE: 07/10/2023    PREOPERATIVE DIAGNOSIS: None reducible left inguinal hernia    POSTOPERATIVE DIAGNOSIS: None reducible left inguinal hernia, right inguinal hernia    PROCEDURE:   1. Robotic repair of non reducible left inguinal hernia -modifier 22  2. Robotic repair of reducible right inguinal hernia    SURGEON: Azael Silva M.D    ASSISTANT: Yazmin Miller MD PGY V    ANESTHESIA: General    ESTIMATED BLOOD LOSS: 40 cc    SPECIMEN: None    CONDITION: Stable    COMPLICATIONS: None    FINDINGS:   1. Large left indirect inguinal hernia containing sigmoid colon and a large cord lipoma   2. Small right indirect inguinal hernia and large cord lipoma     INDICATIONS: The patient is a 58-year-old male presented with symptomatic left inguinal bulge consistent with a non reducible inguinal hernia.  Patient was counseled on options agreed proceed with robotic repair with mesh of the left side and possibly right if found.    PROCEDURE IN DETAIL: Patient taken operating room placed in supine position where general endotracheal intubation was achieved. A Kaur catheter was placed to decompress the bladder and removed at the end of the case.  He received perioperative antibiotics.  His right arm was tucked with appropriate padding.  His abdomen was prepped and draped typical sterile fashion.  Time-out performed by members of the operative team. A Veress needle was inserted at ferreira's point attached insufflation. We had appropriate initial pressures and pneumoperitoneum was achieved.  After injecting local anesthetic a stab incision made in the right upper outer lateral abdomen an 8 mm Optiview trocar was inserted.  Our entry site and Veress site without injury.  Two additional 8 mm trocars were placed in the upper mid and upper left lateral abdomen under direct visualization after injecting local anesthetic.  The patient was then placed in Trendelenburg position.  He had a large bowel containing left  indirect inguinal hernia.  Also noted have a small right indirect inguinal hernia.    Began dissection on the left side by creating a peritoneal flap. Medially this is carried down to the pubic tubercle and extended laterally along Maksim's ligament.  There was no direct hernia or femoral hernia present. Vaginal we created a pocket in the preperitoneal space. Care was taken to avoid injuring the structures on the pelvic sidewall. We then dissected around down towards a very large left inguinal hernia. At this point I elected to reduce the contents.  He required some work to get it to reduce including external pressure and internal pulling.  Care was taken to avoid injury reduce the majority of it which appeared to be sigmoid colon which was healthy and viable.  I then began the process of dissecting free the large indirect hernia sac. This was complicated by scar tissue in the left inguinal region that possibly could be related previous episodes of diverticulitis for some other inflammation? He also had a very large cord lipoma which also distort his on the anatomy and made difficulty identifying the testicular vessels. I was able to identify the vas deferens and preserve this.  Immediately I was able to identify the peritoneum and traced back.  Finally was able to create a window incompletely reduced the hernia sac.  Then had discern between the cord lipoma and the cord structures.  This was accomplished the cord lipoma was completely reduced. This dissection took twice as long as typically be required. Satisfied this dissection then turned my attention to the right side. A similar dissection began creating a peritoneal flap.  Again medially we had the pubic tubercle exposed and we dissected laterally along Maksim's ligament. No significant direct hernia or femoral hernia was present. Laterally create a pocket in the peritoneum again preserving the pelvic sidewall structures. The peritoneal sac was reduced  relatively easily however on this side he also had a large cord lipoma. This was reduced  from the vas deferens and testicular vessels.  Our dissection was carried down well below the bifurcation.  On both sides we had adequate dissection inferiorly lead 3 cm below the ileopubic tract. We then placed ProGrip mesh, left and right side, 15 x 10.  Both pieces of mesh were found to lay flat with good coverage of myopectineal orifices. The common peritoneal flap was then closed with running 2-0 V lock sutures.  A small rent on the right side was closed with interrupted Vicryl suture.  All suture material was removed. Hemostasis was adequate.  The robot was undocked.  The abdomen was desufflated pneumoperitoneum.  All skin incisions were closed 4-0 Monocryl subcuticular stitches and Dermabond. Patient was aroused from sedation, extubated taken to recovery room stable condition having suffered no complications.  All counts correct x2 the case.  I was present scrubbed throughout all operative portions of the case except for final skin closure.    The left-sided hernia proved to be very difficult and dissection due to the large cord lipoma in the large bowel containing hernia sac. This dissection was tedious and somewhat cumbersome due to the redundant tissue. It took at least twice as long his typically would be required for an inguinal hernia repair. For this reason I feel should qualify as a modifier 22.     DISPO: PACU

## 2023-07-10 NOTE — TRANSFER OF CARE
"Anesthesia Transfer of Care Note    Patient: Camilo Herrera    Procedure(s) Performed: Procedure(s) (LRB):  ROBOTIC REPAIR, HERNIA, INGUINAL (Bilateral)    Patient location: PACU    Anesthesia Type: general    Transport from OR: Transported from OR on 6-10 L/min O2 by face mask with adequate spontaneous ventilation    Post pain: adequate analgesia    Post assessment: no apparent anesthetic complications    Post vital signs: stable    Level of consciousness: sedated    Nausea/Vomiting: no nausea/vomiting    Complications: none    Transfer of care protocol was followed      Last vitals:   Visit Vitals  BP (!) 141/81 (BP Location: Right arm, Patient Position: Lying)   Pulse 84   Temp 36.6 °C (97.9 °F) (Oral)   Resp 18   Ht 5' 10" (1.778 m)   Wt 75.8 kg (167 lb)   SpO2 98%   BMI 23.96 kg/m²     "

## 2023-07-13 ENCOUNTER — TELEPHONE (OUTPATIENT)
Dept: SURGERY | Facility: CLINIC | Age: 58
End: 2023-07-13
Payer: COMMERCIAL

## 2023-07-13 NOTE — ANESTHESIA POSTPROCEDURE EVALUATION
Anesthesia Post Evaluation    Patient: Camilo Herrera    Procedure(s) Performed: Procedure(s) (LRB):  ROBOTIC REPAIR, HERNIA, INGUINAL (Bilateral)    Final Anesthesia Type: general      Patient location during evaluation: PACU  Patient participation: Yes- Able to Participate  Level of consciousness: awake and alert  Post-procedure vital signs: reviewed and stable  Pain management: adequate  Airway patency: patent    PONV status at discharge: No PONV  Anesthetic complications: no      Cardiovascular status: blood pressure returned to baseline  Respiratory status: unassisted and room air  Hydration status: euvolemic  Follow-up not needed.          Vitals Value Taken Time   /74 07/10/23 1415   Temp 36.7 °C (98.1 °F) 07/10/23 1145   Pulse 87 07/10/23 1415   Resp 16 07/10/23 1415   SpO2 96 % 07/10/23 1415         Event Time   Out of Recovery 11:51:00         Pain/Herlinda Score: No data recorded

## 2023-07-13 NOTE — TELEPHONE ENCOUNTER
----- Message from Bridgetkirsten Gonsales sent at 7/13/2023  9:56 AM CDT -----  Contact: Pt's wife  Type: Needs Medical Advice    Who Called:  Patient's wife  What is this regarding?:  They had faxed some paperwork for him to go back to work. And they are needing signed and send back. Was it it received?  Best Call Back Number:  [phone] or [phone], wife  Additional Information:  Please call the patient's wife back at the phone number listed above to advise. Thank you!

## 2023-07-24 ENCOUNTER — PATIENT MESSAGE (OUTPATIENT)
Dept: RESEARCH | Facility: HOSPITAL | Age: 58
End: 2023-07-24
Payer: COMMERCIAL

## 2023-07-24 ENCOUNTER — OFFICE VISIT (OUTPATIENT)
Dept: SURGERY | Facility: CLINIC | Age: 58
End: 2023-07-24
Payer: COMMERCIAL

## 2023-07-24 VITALS
RESPIRATION RATE: 16 BRPM | DIASTOLIC BLOOD PRESSURE: 72 MMHG | TEMPERATURE: 98 F | HEIGHT: 70 IN | WEIGHT: 167.13 LBS | SYSTOLIC BLOOD PRESSURE: 111 MMHG | HEART RATE: 53 BPM | BODY MASS INDEX: 23.93 KG/M2

## 2023-07-24 DIAGNOSIS — Z98.890 S/P BILATERAL INGUINAL HERNIA REPAIR: Primary | ICD-10-CM

## 2023-07-24 DIAGNOSIS — Z87.19 S/P BILATERAL INGUINAL HERNIA REPAIR: Primary | ICD-10-CM

## 2023-07-24 PROCEDURE — 3078F PR MOST RECENT DIASTOLIC BLOOD PRESSURE < 80 MM HG: ICD-10-PCS | Mod: CPTII,S$GLB,, | Performed by: SURGERY

## 2023-07-24 PROCEDURE — 99024 POSTOP FOLLOW-UP VISIT: CPT | Mod: S$GLB,,, | Performed by: SURGERY

## 2023-07-24 PROCEDURE — 3074F PR MOST RECENT SYSTOLIC BLOOD PRESSURE < 130 MM HG: ICD-10-PCS | Mod: CPTII,S$GLB,, | Performed by: SURGERY

## 2023-07-24 PROCEDURE — 3008F PR BODY MASS INDEX (BMI) DOCUMENTED: ICD-10-PCS | Mod: CPTII,S$GLB,, | Performed by: SURGERY

## 2023-07-24 PROCEDURE — 1159F MED LIST DOCD IN RCRD: CPT | Mod: CPTII,S$GLB,, | Performed by: SURGERY

## 2023-07-24 PROCEDURE — 3008F BODY MASS INDEX DOCD: CPT | Mod: CPTII,S$GLB,, | Performed by: SURGERY

## 2023-07-24 PROCEDURE — 99024 PR POST-OP FOLLOW-UP VISIT: ICD-10-PCS | Mod: S$GLB,,, | Performed by: SURGERY

## 2023-07-24 PROCEDURE — 3074F SYST BP LT 130 MM HG: CPT | Mod: CPTII,S$GLB,, | Performed by: SURGERY

## 2023-07-24 PROCEDURE — 1159F PR MEDICATION LIST DOCUMENTED IN MEDICAL RECORD: ICD-10-PCS | Mod: CPTII,S$GLB,, | Performed by: SURGERY

## 2023-07-24 PROCEDURE — 3078F DIAST BP <80 MM HG: CPT | Mod: CPTII,S$GLB,, | Performed by: SURGERY

## 2023-07-28 NOTE — PROGRESS NOTES
GENERAL SURGERY  POST-OP PROGRESS NOTE    HPI: Camilo Herrera is a 58 y.o. male status post robotic repair of non reducible large left inguinal hernia and a reducible right inguinal hernia with mesh on 07/10/2023 here for follow-up. Patient had a decent amount of scrotal swelling postoperatively but this has now resolved.  Still with some soreness and firmness along the left testicle. No major symptoms on the right. No fevers or chills, nausea vomiting.    VITALS:  Vitals:    07/24/23 1415   BP: 111/72   Pulse: (!) 53   Resp: 16   Temp: 98 °F (36.7 °C)       PHYSICAL EXAM:  Abdominal robotic port sites well healed without signs of infection or breakdown    Along the left cord structures there was some inflammation but no significant seroma or hematoma or ecchymosis  No significant issues along the right side    PATHOLOGY:  N/a    ASSESSMENT & PLAN:  58 y.o. male s/p robotic repair bilateral inguinal hernias with mesh  -patient doing well given the large size of his left inguinal hernia  -has some residual soreness and inflammation but no large seroma or hematoma, expect this to continue to improve over the next 2-4 weeks  -avoid heavy lifting and straining for a total of 6 weeks  -return to clinic if soreness is not improve

## 2023-07-31 ENCOUNTER — PATIENT MESSAGE (OUTPATIENT)
Dept: RESEARCH | Facility: HOSPITAL | Age: 58
End: 2023-07-31
Payer: COMMERCIAL

## 2023-08-28 ENCOUNTER — TELEPHONE (OUTPATIENT)
Dept: SURGERY | Facility: CLINIC | Age: 58
End: 2023-08-28
Payer: COMMERCIAL

## 2023-08-28 NOTE — TELEPHONE ENCOUNTER
----- Message from Hillary Price sent at 8/28/2023  8:52 AM CDT -----  Contact: pt  Type: Needs Medical Advice  Who Called:  pt  Best Call Back Number: [phone]      Additional Information:   Pt us calling the office regarding back to work papers, job sent papers over was trying to see if office received.Please call back and advise.

## 2023-09-01 ENCOUNTER — OFFICE VISIT (OUTPATIENT)
Dept: SURGERY | Facility: CLINIC | Age: 58
End: 2023-09-01
Payer: COMMERCIAL

## 2023-09-01 VITALS — DIASTOLIC BLOOD PRESSURE: 93 MMHG | TEMPERATURE: 99 F | SYSTOLIC BLOOD PRESSURE: 150 MMHG | HEART RATE: 77 BPM

## 2023-09-01 DIAGNOSIS — Z98.890 S/P BILATERAL INGUINAL HERNIA REPAIR: Primary | ICD-10-CM

## 2023-09-01 DIAGNOSIS — Z87.19 S/P BILATERAL INGUINAL HERNIA REPAIR: Primary | ICD-10-CM

## 2023-09-01 PROCEDURE — 99024 POSTOP FOLLOW-UP VISIT: CPT | Mod: S$GLB,,, | Performed by: SURGERY

## 2023-09-01 PROCEDURE — 99024 PR POST-OP FOLLOW-UP VISIT: ICD-10-PCS | Mod: S$GLB,,, | Performed by: SURGERY

## 2023-09-01 PROCEDURE — 3080F DIAST BP >= 90 MM HG: CPT | Mod: CPTII,S$GLB,, | Performed by: SURGERY

## 2023-09-01 PROCEDURE — 3080F PR MOST RECENT DIASTOLIC BLOOD PRESSURE >= 90 MM HG: ICD-10-PCS | Mod: CPTII,S$GLB,, | Performed by: SURGERY

## 2023-09-01 PROCEDURE — 3077F PR MOST RECENT SYSTOLIC BLOOD PRESSURE >= 140 MM HG: ICD-10-PCS | Mod: CPTII,S$GLB,, | Performed by: SURGERY

## 2023-09-01 PROCEDURE — 3077F SYST BP >= 140 MM HG: CPT | Mod: CPTII,S$GLB,, | Performed by: SURGERY

## 2023-09-01 RX ORDER — GABAPENTIN 100 MG/1
100 CAPSULE ORAL 3 TIMES DAILY
Qty: 90 CAPSULE | Refills: 1 | Status: SHIPPED | OUTPATIENT
Start: 2023-09-01 | End: 2024-08-31

## 2023-09-01 RX ORDER — KETOROLAC TROMETHAMINE 10 MG/1
10 TABLET, FILM COATED ORAL EVERY 8 HOURS
Qty: 15 TABLET | Refills: 0 | Status: SHIPPED | OUTPATIENT
Start: 2023-09-01 | End: 2023-09-06

## 2023-09-01 RX ORDER — HYDROCODONE BITARTRATE AND ACETAMINOPHEN 5; 325 MG/1; MG/1
1 TABLET ORAL EVERY 6 HOURS PRN
Qty: 16 TABLET | Refills: 0 | Status: SHIPPED | OUTPATIENT
Start: 2023-09-01

## 2023-09-01 NOTE — LETTER
September 1, 2023    Camilo Herrera  [address]  [place] [address]         [facility] General [facility]  [address]  [place] [address]  Phone: [phone]  Fax: [phone] September 1, 2023     Patient: Camilo Herrera   YOB: 1965   Date of Visit: 9/1/2023       To Whom It May Concern:    It is my medical opinion that Camilo Herrera may return to work on 9/11/23 without restriction .    If you have any questions or concerns, please don't hesitate to call.    Sincerely,        Azael Silva Jr., MD

## 2023-09-18 NOTE — PROGRESS NOTES
GENERAL SURGERY  POST-OP PROGRESS NOTE    HPI: Camilo Herrera is a 58 y.o. male status post robotic repair of non reducible large left inguinal hernia and a reducible right inguinal hernia with mesh on 07/10/2023. Did well postoperatively but did some residual soreness and inflammation. He was here today for evaluation for some discomfort in the groin. No fevers or chills. No obstructive symptoms.    VITALS:  Vitals:    09/01/23 1058   BP: (!) 150/93   Pulse: 77   Temp: 98.6 °F (37 °C)       PHYSICAL EXAM:  Abdominal robotic port sites well healed without signs of infection or breakdown    No significant swelling or bulging in either the right or left groin. Patient reports some tenderness in the left inner thigh. Cord structures are palpable and mildly tender but not significantly inflamed.    PATHOLOGY:  N/a    ASSESSMENT & PLAN:  58 y.o. male s/p robotic repair bilateral inguinal hernias with mesh  -no evidence of recurrence  -symptoms relatively mild  -if symptoms persist will consider ultrasound otherwise return to clinic p.r.n.

## (undated) DEVICE — NDL INSUF ULTRA VERESS 120MM

## (undated) DEVICE — SOL ELECTROLUBE ANTI-STIC

## (undated) DEVICE — COVER TIP CURVED SCISSORS XI

## (undated) DEVICE — SEAL UNIVERSAL 5MM-8MM XI

## (undated) DEVICE — SLEEVE SCD EXPRESS KNEE MEDIUM

## (undated) DEVICE — TRAY CATH FOL SIL URIMTR 16FR

## (undated) DEVICE — SYR 30CC LUER LOCK

## (undated) DEVICE — NDL BLUNT W/O FILTER 18GX1.5IN

## (undated) DEVICE — GLOVE SURGEONS ULTRA TOUCH 6.5

## (undated) DEVICE — SOL NACL IRR 1000ML BTL

## (undated) DEVICE — GLOVE SURG ULTRA TOUCH 7

## (undated) DEVICE — ADHESIVE DERMABOND ADVANCED

## (undated) DEVICE — PACK SIRUS BASIC V SURG STRL

## (undated) DEVICE — GOWN POLY REINF BRTH SLV XL

## (undated) DEVICE — SYR LUER LOCK STERILE 10ML

## (undated) DEVICE — SUT V-LOC 180 2-0 GS-22 9IN

## (undated) DEVICE — APPLICATOR CHLORAPREP ORN 26ML

## (undated) DEVICE — DRAPE COLUMN DAVINCI XI

## (undated) DEVICE — GLOVE BIOGEL PI MICRO INDIC 7

## (undated) DEVICE — STRAP OR TABLE 5IN X 72IN

## (undated) DEVICE — NDL SURE-SNAP HYPO SAF 21GX1.5

## (undated) DEVICE — TOWEL OR DISP STRL BLUE 4/PK

## (undated) DEVICE — SUT MONOCRYL 4-0 PS-2

## (undated) DEVICE — SUT 3-0 VICRYL / SH (J416)

## (undated) DEVICE — OBTURATOR BLADELESS 8MM XI CLR

## (undated) DEVICE — ELECTRODE REM PLYHSV RETURN 9

## (undated) DEVICE — PACK CUSTOM UNIV BASIN SLI

## (undated) DEVICE — DRAPE ABDOMINAL TIBURON 14X11

## (undated) DEVICE — DRAPE ARM DAVINCI XI

## (undated) DEVICE — SET TUBE PNEUMOCLEAR SE HI FLO

## (undated) DEVICE — CATH IV INTROCAN 14G X 2.

## (undated) DEVICE — BLADE SURG CARBON STEEL SZ11

## (undated) DEVICE — SOL CLEARIFY VISUALIZATION LAP

## (undated) DEVICE — SUPPORTER 3IN ELSTC WAIST KNIT